# Patient Record
Sex: MALE | URBAN - METROPOLITAN AREA
[De-identification: names, ages, dates, MRNs, and addresses within clinical notes are randomized per-mention and may not be internally consistent; named-entity substitution may affect disease eponyms.]

---

## 2023-04-03 ENCOUNTER — OFFICE VISIT (OUTPATIENT)
Dept: URBAN - METROPOLITAN AREA CLINIC 30 | Facility: CLINIC | Age: 53
End: 2023-04-03
Payer: COMMERCIAL

## 2023-04-03 DIAGNOSIS — H18.831 RECURRENT CORNEAL EROSION OF CORNEA, RIGHT EYE: Primary | ICD-10-CM

## 2023-04-03 DIAGNOSIS — H18.511 ENDOTHELIAL CORNEAL DYSTROPHY, RIGHT EYE: ICD-10-CM

## 2023-04-03 PROCEDURE — 99204 OFFICE O/P NEW MOD 45 MIN: CPT | Performed by: OPTOMETRIST

## 2023-04-03 RX ORDER — DOXYCYCLINE 100 MG/1
100 MG CAPSULE ORAL
Qty: 45 | Refills: 0 | Status: ACTIVE
Start: 2023-04-03

## 2023-04-03 RX ORDER — LOTEPREDNOL ETABONATE 5 MG/G
0.5 % GEL OPHTHALMIC
Qty: 5 | Refills: 1 | Status: ACTIVE
Start: 2023-04-03

## 2023-04-03 NOTE — IMPRESSION/PLAN
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
-denies past trauma
+endothelial defect Plan: Epi disruption today. Has erythro le from ER. Start doxycycline 100 mg PO daily, Lotemax TID OD x 6 weeks, mike le QHS OD. Pt educ on condition and understands may need cornea consult if continues to recur.

## 2023-05-03 ENCOUNTER — OFFICE VISIT (OUTPATIENT)
Dept: URBAN - METROPOLITAN AREA CLINIC 30 | Facility: CLINIC | Age: 53
End: 2023-05-03
Payer: COMMERCIAL

## 2023-05-03 DIAGNOSIS — H18.511 ENDOTHELIAL CORNEAL DYSTROPHY, RIGHT EYE: ICD-10-CM

## 2023-05-03 DIAGNOSIS — H18.831 RECURRENT CORNEAL EROSION OF CORNEA, RIGHT EYE: Primary | ICD-10-CM

## 2023-05-03 PROCEDURE — 99213 OFFICE O/P EST LOW 20 MIN: CPT | Performed by: OPTOMETRIST

## 2023-05-03 ASSESSMENT — INTRAOCULAR PRESSURE
OD: 14
OS: 14

## 2023-05-03 NOTE — IMPRESSION/PLAN
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
-denies past trauma-did scuba diving 
+endothelial defect Plan: No recurrences in past few weeks, comfortable. Continue doxycycline 100 mg PO daily x 2 more weeks, Lotemax TID OD x 2 more weeks. Cont mike le QHS OD long term. Pt educ on condition and understands may need cornea consult if continues to recur. Endothelial defect may contribute to erosions.

## 2023-07-17 ENCOUNTER — OFFICE VISIT (OUTPATIENT)
Dept: URBAN - METROPOLITAN AREA CLINIC 30 | Facility: CLINIC | Age: 53
End: 2023-07-17
Payer: COMMERCIAL

## 2023-07-17 DIAGNOSIS — H52.212 IRREGULAR ASTIGMATISM, LEFT EYE: ICD-10-CM

## 2023-07-17 DIAGNOSIS — H18.831 RECURRENT CORNEAL EROSION OF CORNEA, RIGHT EYE: Primary | ICD-10-CM

## 2023-07-17 PROCEDURE — 99213 OFFICE O/P EST LOW 20 MIN: CPT | Performed by: OPTOMETRIST

## 2023-07-17 PROCEDURE — 92025 CPTRIZED CORNEAL TOPOGRAPHY: CPT | Performed by: OPTOMETRIST

## 2023-07-17 ASSESSMENT — KERATOMETRY
OS: 44.09
OD: 45.62

## 2023-07-17 ASSESSMENT — INTRAOCULAR PRESSURE
OS: 13
OD: 12

## 2023-07-17 ASSESSMENT — VISUAL ACUITY
OD: 20/20
OS: 20/20

## 2023-07-17 NOTE — IMPRESSION/PLAN
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 Plan: Inferior steepening OS on EMMA today, pt educ. Monitor for changes. Denies family history of keratoconus/corneal issues.

## 2023-07-17 NOTE — IMPRESSION/PLAN
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
-denies past trauma-did scuba diving 
+endothelial defect Plan: Finished previous Lotemax and doxy course as instructed. Using Nupur QHS OD-continue long term. Using ATs a few times/week noting intermittent FBS OD. Pt educ on condition and understands may need cornea consult if continues to recur. Endothelial defect may contribute to erosions. Ubaldo today.

## 2024-04-11 ENCOUNTER — OFFICE VISIT (OUTPATIENT)
Dept: URBAN - METROPOLITAN AREA CLINIC 30 | Facility: CLINIC | Age: 54
End: 2024-04-11
Payer: COMMERCIAL

## 2024-04-11 DIAGNOSIS — H18.831 RECURRENT CORNEAL EROSION OF CORNEA, RIGHT EYE: Primary | ICD-10-CM

## 2024-04-11 PROCEDURE — 99214 OFFICE O/P EST MOD 30 MIN: CPT | Performed by: OPTOMETRIST

## 2024-04-11 RX ORDER — OFLOXACIN 3 MG/ML
0.3 % SOLUTION/ DROPS OPHTHALMIC
Qty: 5 | Refills: 0 | Status: ACTIVE
Start: 2024-04-11

## 2024-04-11 ASSESSMENT — INTRAOCULAR PRESSURE
OD: 14
OS: 13

## 2024-04-15 ENCOUNTER — OFFICE VISIT (OUTPATIENT)
Dept: URBAN - METROPOLITAN AREA CLINIC 30 | Facility: CLINIC | Age: 54
End: 2024-04-15
Payer: COMMERCIAL

## 2024-04-15 DIAGNOSIS — H18.831 RECURRENT CORNEAL EROSION OF CORNEA, RIGHT EYE: Primary | ICD-10-CM

## 2024-04-15 PROCEDURE — 99213 OFFICE O/P EST LOW 20 MIN: CPT | Performed by: OPTOMETRIST

## 2024-07-10 ENCOUNTER — OFFICE VISIT (OUTPATIENT)
Dept: URBAN - METROPOLITAN AREA CLINIC 10 | Facility: CLINIC | Age: 54
End: 2024-07-10
Payer: COMMERCIAL

## 2024-07-10 DIAGNOSIS — H18.511 ENDOTHELIAL CORNEAL DYSTROPHY, RIGHT EYE: ICD-10-CM

## 2024-07-10 DIAGNOSIS — H18.831 RECURRENT CORNEAL EROSION OF CORNEA, RIGHT EYE: Primary | ICD-10-CM

## 2024-07-10 PROCEDURE — 92025 CPTRIZED CORNEAL TOPOGRAPHY: CPT | Performed by: OPHTHALMOLOGY

## 2024-07-10 PROCEDURE — 99204 OFFICE O/P NEW MOD 45 MIN: CPT | Performed by: OPHTHALMOLOGY

## 2024-07-10 PROCEDURE — 92286 ANT SGM IMG I&R SPECLR MIC: CPT | Performed by: OPHTHALMOLOGY
